# Patient Record
Sex: MALE | HISPANIC OR LATINO | Employment: FULL TIME | URBAN - METROPOLITAN AREA
[De-identification: names, ages, dates, MRNs, and addresses within clinical notes are randomized per-mention and may not be internally consistent; named-entity substitution may affect disease eponyms.]

---

## 2024-02-18 ENCOUNTER — TRANSFERRED RECORDS (OUTPATIENT)
Dept: HEALTH INFORMATION MANAGEMENT | Facility: CLINIC | Age: 29
End: 2024-02-18

## 2024-02-19 ENCOUNTER — TELEPHONE (OUTPATIENT)
Dept: BEHAVIORAL HEALTH | Facility: CLINIC | Age: 29
End: 2024-02-19

## 2024-02-19 ENCOUNTER — TELEPHONE (OUTPATIENT)
Dept: BEHAVIORAL HEALTH | Facility: HOSPITAL | Age: 29
End: 2024-02-19

## 2024-02-19 ENCOUNTER — TRANSFERRED RECORDS (OUTPATIENT)
Dept: HEALTH INFORMATION MANAGEMENT | Facility: CLINIC | Age: 29
End: 2024-02-19

## 2024-02-19 ENCOUNTER — HOSPITAL ENCOUNTER (INPATIENT)
Facility: HOSPITAL | Age: 29
LOS: 2 days | Discharge: HOME | DRG: 881 | End: 2024-02-21
Attending: STUDENT IN AN ORGANIZED HEALTH CARE EDUCATION/TRAINING PROGRAM | Admitting: STUDENT IN AN ORGANIZED HEALTH CARE EDUCATION/TRAINING PROGRAM
Payer: COMMERCIAL

## 2024-02-19 PROBLEM — R45.851 SUICIDAL IDEATION: Status: ACTIVE | Noted: 2024-02-19

## 2024-02-19 LAB
ALT SERPL-CCNC: 48 IU/L
AST SERPL-CCNC: 37 IU/L (ref 15–46)
CREATININE (EXTERNAL): 0.9 MG/DL (ref 0.66–1.25)
GFR ESTIMATED (EXTERNAL): >90 ML/MIN/1.73M2
GLUCOSE (EXTERNAL): 107 MG/DL (ref 70–100)
POTASSIUM (EXTERNAL): 4.2 MMOL/L (ref 3.5–5.1)
TSH SERPL-ACNC: 2.95 UIU/ML (ref 0.47–4.68)

## 2024-02-19 PROCEDURE — 124N000001 HC R&B MH

## 2024-02-19 RX ORDER — OLANZAPINE 10 MG/1
10 TABLET ORAL EVERY 6 HOURS PRN
Status: DISCONTINUED | OUTPATIENT
Start: 2024-02-19 | End: 2024-02-21 | Stop reason: HOSPADM

## 2024-02-19 RX ORDER — HYDROXYZINE HYDROCHLORIDE 25 MG/1
25 TABLET, FILM COATED ORAL EVERY 6 HOURS PRN
Status: DISCONTINUED | OUTPATIENT
Start: 2024-02-19 | End: 2024-02-21 | Stop reason: HOSPADM

## 2024-02-19 RX ORDER — CLONIDINE HYDROCHLORIDE 0.1 MG/1
0.1 TABLET ORAL 2 TIMES DAILY PRN
Status: DISCONTINUED | OUTPATIENT
Start: 2024-02-19 | End: 2024-02-21 | Stop reason: HOSPADM

## 2024-02-19 RX ORDER — OLANZAPINE 10 MG/2ML
10 INJECTION, POWDER, FOR SOLUTION INTRAMUSCULAR EVERY 6 HOURS PRN
Status: DISCONTINUED | OUTPATIENT
Start: 2024-02-19 | End: 2024-02-21 | Stop reason: HOSPADM

## 2024-02-19 RX ORDER — AMOXICILLIN 250 MG
1 CAPSULE ORAL 2 TIMES DAILY PRN
Status: DISCONTINUED | OUTPATIENT
Start: 2024-02-19 | End: 2024-02-21 | Stop reason: HOSPADM

## 2024-02-19 RX ORDER — MAGNESIUM HYDROXIDE/ALUMINUM HYDROXICE/SIMETHICONE 120; 1200; 1200 MG/30ML; MG/30ML; MG/30ML
30 SUSPENSION ORAL EVERY 4 HOURS PRN
Status: DISCONTINUED | OUTPATIENT
Start: 2024-02-19 | End: 2024-02-21 | Stop reason: HOSPADM

## 2024-02-19 RX ORDER — ACETAMINOPHEN 325 MG/1
650 TABLET ORAL EVERY 4 HOURS PRN
Status: DISCONTINUED | OUTPATIENT
Start: 2024-02-19 | End: 2024-02-21 | Stop reason: HOSPADM

## 2024-02-19 RX ORDER — TRAZODONE HYDROCHLORIDE 50 MG/1
50 TABLET, FILM COATED ORAL
Status: DISCONTINUED | OUTPATIENT
Start: 2024-02-19 | End: 2024-02-21 | Stop reason: HOSPADM

## 2024-02-19 ASSESSMENT — ACTIVITIES OF DAILY LIVING (ADL)
ADLS_ACUITY_SCORE: 28
LAUNDRY: UNABLE TO COMPLETE
ORAL_HYGIENE: INDEPENDENT
ADLS_ACUITY_SCORE: 28
DRESS: INDEPENDENT
HYGIENE/GROOMING: INDEPENDENT
HYGIENE/GROOMING: INDEPENDENT
DRESS: SCRUBS (BEHAVIORAL HEALTH);INDEPENDENT
ADLS_ACUITY_SCORE: 45
ADLS_ACUITY_SCORE: 28
ORAL_HYGIENE: INDEPENDENT
ADLS_ACUITY_SCORE: 28

## 2024-02-19 NOTE — DISCHARGE INSTRUCTIONS
Behavioral Discharge Planning and Instructions    Summary:     Main Diagnosis:     Health Care Follow-up:       [facility]  [address] [place] [address]  [phone]    [facility]  [address] [place] [address]  [phone]    [facility]   [address] [address]  Phone:  [phone]    [employer]  [address]  [place] [address] [phone]    Attend all scheduled appointments with your outpatient providers. Call at least 24 hours in advance if you need to reschedule an appointment to ensure continued access to your outpatient providers.     Major Treatments, Procedures and Findings:  You were provided with: a psychiatric assessment, assessed for medical stability, medication evaluation and/or management, group therapy, family therapy, individual therapy, CD evaluation/assessment, milieu management, and medical interventions    Symptoms to Report: feeling more aggressive, increased confusion, losing more sleep, mood getting worse, or thoughts of suicide    Early warning signs can include: increased depression or anxiety sleep disturbances increased thoughts or behaviors of suicide or self-harm  increased unusual thinking, such as paranoia or hearing voices    Safety and Wellness:  Take all medicines as directed.  Make no changes unless your doctor suggests them.      Follow treatment recommendations.  Refrain from alcohol and non-prescribed drugs.  Ask your support system to help you reduce your access to items that could harm yourself or others. Items could include:  Firearms  Medicines (both prescribed and over-the-counter)  Knives and other sharp objects  Ropes and like materials  Car keys  If there is a concern for safety, call 911. If there is a concern for safety, call 911.    Resources:   Crisis Intervention: 875.829.8208 or 668-803-4522 (TTY: 148.794.2044).  Call anytime for help.  National [place] on Mental Illness  "(www.mn.nash.org): 661.773.5983 or 238-770-8581.  [place] Association for Children's Mental Health (www.mac.org): 435.927.6753.  Alcoholics Anonymous (www.alcoholics-anonymous.org): Check your phone book for your local chapter.  Suicide Awareness Voices of Education (SAVE) (www.save.org): 969-748-FAMI (5988)  National Suicide Prevention Line (www.mentalhealthmn.org): 389-733-QYEX (2108)  Mental Health Consumer/Survivor Network of [place] (www.mhcsn.net): 850.724.8597 or 906-212-9300  Mental Health Association of [place] (www.mentalhealth.org): 365.534.1432 or 340-615-6394  Self- Management and Recovery Training., SMART-- Toll free: 863.806.5568  www.Prelert  Text 4 Life: txt \"LIFE\" to 71748 for immediate support and crisis intervention  Crisis text line: Text \"[place]\" to 969514. Free, confidential, 24/7.  Crisis Intervention: 576.227.9526 or 944-457-2381. Call anytime for help.     General Medication Instructions:   See your medication sheet(s) for instructions.   Take all medicines as directed.  Make no changes unless your doctor suggests them.   Go to all your doctor visits.  Be sure to have all your required lab tests. This way, your medicines can be refilled on time.  Do not use any drugs not prescribed by your doctor.  Avoid alcohol.    Advance Directives:   Scanned document on file with [facility]? No scanned doc  Is document scanned? Pt states no documents  Honoring Choices Your Rights Handout: Informed and given  Was more information offered? Minor-N/A    The Treatment team has appreciated the opportunity to work with you. If you have any questions or concerns about your recent admission, you can contact the unit which can receive your call 24 hours a day, 7 days a week. They will be able to get in touch with a Provider if needed. The unit number is [phone] .  "

## 2024-02-19 NOTE — PROGRESS NOTES
02/19/24 1502   Patient Belongings   Did you bring any home meds/supplements to the hospital?  No   Patient Belongings locker;sent to security per site process   Patient Belongings Put in Hospital Secure Location (Security or Locker, etc.) cash/credit card;cell phone/electronics;clothing;shoes;wallet   Belongings Search Yes   Clothing Search Yes   Second Staff Xi SUÁREZ   Comment Red white and blue crocks, grey sweat shirt, red white and blue cap, black t-shirt, black socks, green underware, black sweats     List items sent to safe: Black verizon phone in black case ( no cracks), garage ,Brown wallet, drivers license, cabelas, [employer] Vickery visa, permit to carry x2, costco , best buy, new depot, care credit card, delta dental, health care, money network visa x2, ehsan visa, social security  card,meical release to drive, several misc. Cards    All other belongings put in assigned cubby in belongings room.    I have reviewed my belongings list on admission and verify that it is correct.     Patient signature_______________________________    Second staff witness (if patient unable to sign) ______________________________       I have received all my belongings at discharge.    Patient signature________________________________    Divina   2/19/2024  3:05 PM

## 2024-02-19 NOTE — TELEPHONE ENCOUNTER
R:     8:52A Received call from Pao  Nasim informing direct admission from outside facility admitting to /[facility]. Requesting Indicia to be completed.     9:19A Indicia completed.
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
## 2024-02-19 NOTE — PLAN OF CARE
Face to face shift report received from Xi FRANCOIS RN. Rounding completed, pt observed.    Problem: Adult Behavioral Health Plan of Care  Goal: Develops/Participates in Therapeutic [place] to Support Successful Transition  Note: Shift Summary: Patient was resting in bed at the start of this shift.  Patient awakened for evening meal.  Patient quiet and withdrawn.  Ate well but chose to isolate to his room.     Care continued for next shift.  Patient observed in bed with eyes closed.  Respirations are visible and regular.  Independent with position changes.  Patient has been awake in his bed since 0330.  Denied needs.  Up and in lounge a little before 0600.  Remains quiet and with intermittent eye contact.  Problem: Suicide Risk  Goal: Absence of Self-Harm  Description: Patient will be free of suicidal ideation or intent by discharge.  Patient will agree to safety plan with staff.  Outcome: Progressing   Face to face report will be communicated to oncoming RN.    Millie Asencio RN  2/19/2024

## 2024-02-20 LAB
EST. AVERAGE GLUCOSE BLD GHB EST-MCNC: 120 MG/DL
HBA1C MFR BLD: 5.8 %
HOLD SPECIMEN: NORMAL
HOLD SPECIMEN: NORMAL

## 2024-02-20 PROCEDURE — 83036 HEMOGLOBIN GLYCOSYLATED A1C: CPT | Performed by: NURSE PRACTITIONER

## 2024-02-20 PROCEDURE — 99222 1ST HOSP IP/OBS MODERATE 55: CPT | Mod: AI | Performed by: STUDENT IN AN ORGANIZED HEALTH CARE EDUCATION/TRAINING PROGRAM

## 2024-02-20 PROCEDURE — 36415 COLL VENOUS BLD VENIPUNCTURE: CPT | Performed by: NURSE PRACTITIONER

## 2024-02-20 PROCEDURE — 124N000001 HC R&B MH

## 2024-02-20 PROCEDURE — 99222 1ST HOSP IP/OBS MODERATE 55: CPT | Performed by: NURSE PRACTITIONER

## 2024-02-20 PROCEDURE — 250N000013 HC RX MED GY IP 250 OP 250 PS 637: Performed by: NURSE PRACTITIONER

## 2024-02-20 RX ORDER — MULTIVITAMIN,THERAPEUTIC
1 TABLET ORAL DAILY
COMMUNITY

## 2024-02-20 RX ORDER — MULTIPLE VITAMINS W/ MINERALS TAB 9MG-400MCG
1 TAB ORAL DAILY
Status: DISCONTINUED | OUTPATIENT
Start: 2024-02-20 | End: 2024-02-21 | Stop reason: HOSPADM

## 2024-02-20 RX ORDER — VITAMIN B COMPLEX
1 TABLET ORAL DAILY
Status: DISCONTINUED | OUTPATIENT
Start: 2024-02-20 | End: 2024-02-21 | Stop reason: HOSPADM

## 2024-02-20 RX ADMIN — Medication 1 TABLET: at 14:13

## 2024-02-20 RX ADMIN — MULTIPLE VITAMINS W/ MINERALS TAB 1 TABLET: TAB at 14:13

## 2024-02-20 ASSESSMENT — ACTIVITIES OF DAILY LIVING (ADL)
LAUNDRY: UNABLE TO COMPLETE
ADLS_ACUITY_SCORE: 28
DRESS: INDEPENDENT
ADLS_ACUITY_SCORE: 28
ADLS_ACUITY_SCORE: 28
DRESS: INDEPENDENT
HYGIENE/GROOMING: INDEPENDENT
HYGIENE/GROOMING: INDEPENDENT
ADLS_ACUITY_SCORE: 28
ORAL_HYGIENE: INDEPENDENT
ORAL_HYGIENE: INDEPENDENT

## 2024-02-20 NOTE — PLAN OF CARE
Face to face shift report received from Niki BARR RN. Rounding completed, pt observed.    Problem: Adult Behavioral Health Plan of Care  Goal: Patient-Specific Goal (Individualization)  Description: Patient will sleep at least 5 hours per night during hospital stay.  Patient will eat at least 50% of meals  Patient will maintain ADLs.  Patient will agree to Treatment Team recommendations for after discharge.  Outcome: Progressing  Note: Shift Summary:  Patient was sitting in lounge at the start of this shift. Patient is cooperative with nurse.  Denies any needs. Little interaction with peers. Gait steady.      Problem: Suicide Risk  Goal: Absence of Self-Harm  Description: Patient will be free of suicidal ideation or intent by discharge.  Patient will agree to safety plan with staff.  Outcome: Progressing  Face to face report will be communicated to oncoming RN.    Millie Asencio RN  2/20/2024

## 2024-02-20 NOTE — PLAN OF CARE
"Face to face end of shift report received from Millie HAYES RN. Rounding completed. Patient observed in bed, awake.     Pt has been withdrawn, isolative to his room today. He has concerns that the level of care provided here is more than what he needs. He denied ever being suicidal stating \"My dogs woke me up and next thing I knew I was being brought to the hospital. Everything is just a lot with my wife leaving me, the house. And the bank account.\" Pt denied any SI/HI and AH/VH. Denied pain. Provided shower supplies. He has not attended any groups today. He is able to make his needs known. Steady gait- no falls. Frequent rounding.     Problem: Adult Behavioral Health Plan of Care  Goal: Patient-Specific Goal (Individualization)  Description: Patient will sleep at least 5 hours per night during hospital stay.  Patient will eat at least 50% of meals  Patient will maintain ADLs.  Patient will agree to Treatment Team recommendations for after discharge.  Outcome: Progressing     Problem: Suicide Risk  Goal: Absence of Self-Harm  Description: Patient will be free of suicidal ideation or intent by discharge.  Patient will agree to safety plan with staff.  Outcome: Progressing       Niki Caceres RN  2/20/2024  1:39 PM    "
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
## 2024-02-20 NOTE — H&P
[facility]    History and Physical  Medical Services       Date of Admission:  2/19/2024  Date of Service (when I saw the patient): 02/20/24    Assessment & Plan     Principal Problem:    Suicidal ideation    Active Medical Problems:  Prediabetes- A1c 5.8. Will discuss Metformin with him and if agreeable will order.     Morbid obesity- weight- 157.3 kg, BMI 54.3. Encourage diet and exercise.     Pt medically stable, no acute medical concerns. Chronic medical problems stable. Will sign off. Please consult for any new medical issues or concerns.      Code Status: Full Code    Christy Peña CNP    Primary Care Physician   Physician No Ref-Primary    Chief Complaint   Psych evaluation     History is obtained from the patient and electronic health record    History of Present Illness   Selvin Norwood is a 29 year old male who presents to [facility] in [place] via police presenting with suicidal ideations with intent. Pt presents with increasing depression with suicidal ideations. He is  from his wife. He sent her concerning texts regarding not wanting to be here anymore, you don't understand, I will be watching everyone from above. He had a similar episode 3 years ago where he was found in the woods with a gun.     Past Medical History    I have reviewed this patient's medical history and updated it with pertinent information if needed.   No past medical history on file.    Past Surgical History   I have reviewed this patient's surgical history and updated it with pertinent information if needed.  No past surgical history on file.    Prior to Admission Medications   Prior to Admission Medications   Prescriptions Last Dose Informant Patient Reported? Taking?   B Complex-Biotin-FA (SUPER B-COMPLEX) TABS Past Month  Yes Yes   Sig: Take 1 tablet by mouth daily   Cholecalciferol (VITAMIN D-3 PO) Past Month  Yes Yes   Sig: Take 1 capsule by mouth daily   multivitamin, therapeutic (THERA-VIT) TABS  tablet Past Month  Yes Yes   Sig: Take 1 tablet by mouth daily      Facility-Administered Medications: None     Allergies   No Known Allergies    Social History   I have reviewed this patient's social history and updated it with pertinent information if needed. Selvin Norwood      Family History   I have reviewed this patient's family history and updated it with pertinent information if needed.   No family history on file.    Review of Systems   CONSTITUTIONAL:  negative  EYES:  negative  HEENT:  negative  RESPIRATORY:  negative  CARDIOVASCULAR:  negative  GASTROINTESTINAL:  negative  GENITOURINARY:  negative  INTEGUMENT/BREAST:  negative  HEMATOLOGIC/LYMPHATIC:  negative  ALLERGIC/IMMUNOLOGIC:  negative  ENDOCRINE:  negative  MUSCULOSKELETAL:  negative  NEUROLOGICAL:  negative    Physical Exam   Temp: 98.7  F (37.1  C) Temp src: Temporal BP: 142/88 Pulse: 79   Resp: 16 SpO2: 98 % O2 Device: None (Room air)    Vital Signs with Ranges  Temp:  [97.3  F (36.3  C)-98.8  F (37.1  C)] 98.7  F (37.1  C)  Pulse:  [75-96] 79  Resp:  [16-18] 16  BP: (136-176)/() 142/88  SpO2:  [95 %-99 %] 98 %  346 lbs 11.2 oz    Constitutional: awake, alert, cooperative, no apparent distress, and appears stated age, vitals stable   Eyes: Lids and lashes normal, pupils equal, round and reactive to light, extra ocular muscles intact, sclera clear, conjunctiva normal  ENT: Normocephalic, without obvious abnormality, atraumatic, external ears without lesions, oral pharynx with moist mucous membranes, no erythema or exudates  Hematologic / Lymphatic: no cervical lymphadenopathy  Respiratory: No increased work of breathing, good air exchange, clear to auscultation bilaterally, no crackles or wheezing  Cardiovascular: Normal apical impulse, regular rate and rhythm, normal S1 and S2, no S3 or S4, and no murmur noted  GI: obese, normal bowel sounds, soft, non-distended, non-tender, no masses palpated, no  hepatosplenomegally  Genitounirinary: deferred  Skin: normal skin color, texture, turgor and no redness, warmth, or swelling  Musculoskeletal: There is no redness, warmth, or swelling of the joints.  Full range of motion noted.    Neurologic: Awake, alert, oriented to name, place and time.  Cranial nerves II-XII are grossly intact.    Neuropsychiatric: General: blunted,  calm, and fair eye contact    Data   Data reviewed today:   No lab results found in last 7 days.    No results found for this or any previous visit (from the past 24 hour(s)).

## 2024-02-20 NOTE — PLAN OF CARE
"Social Service Psychosocial Assessment    Presenting Problem: per admission note, \"Per collateral report, pt. Texted wife and stated that he didn't want to be here anymore. Pt. Has a hx. Of going into the woods 3 years ago w/ guns to harm himself.\"   \"pt stated his wife called the  on him and that is why he is here.\" Pt admitted for suicidal ideation.    Marital Status: , currently     Spouse / Children: Wife- Jennifer, 3 children    Psychiatric [place] HX: none reported    Suicide Risk Assessment: Pt admitted with suicidal ideation, Pt denies current or history of suicidal ideation. 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 had an episode 3 years ago where he and his wife were having marital problems and he was found out in the woods with a gun.\"    Access to Lethal Means (explain): firearms - secured    Family Psych HX: no known history      A & Ox: x4      Medication Adherence: See H&P    Medical Issues: See H&P      Visual -Motor Functioning: Good    Communication Skills /Needs: Good    Ethnicity: Choose not to Answer      Spirituality/Hoahaoism Affiliation: none    Clergy Request: No      History: None reported      Living Situation: currently lives alone, previously lived with wife and children     ADL s: Independent       Education: high school and some college    Financial Situation: pt stated that he's \"gotta figure some stuff out\"    Occupation:      Leisure & Recreation: enjoys listening to music, sports, darts, and spending time with friends    Childhood History: grew up with mom as an only child in [place]     Trauma Abuse HX: none    Relationship / Sexuality: heterosexual    Substance Use/ Abuse: pt states \"recreational alcohol use\"    Chemical Dependency Treatment HX: none    Legal Issues: none reported    Significant Life Events: wife moved out recently    Strengths: Ability to communicate needs, in a safe environment, has insurance    Challenges " "/Limitation: Poor coping skills, current mental health symptoms, lack of interest in services    Patient Support Contact (Include name, relationship, number, and summary of conversation): pt currently has no EUNICE signed     Interventions:         Community-Based Programs- would benefit    Medical/Dental Care- would benefit, pt not interested    Medication Management- would benefit, pt not interested    Individual Therapy- would benefit, pt not interested, resources listed in AVS    Insurance Coverage- [facility]     Suicide Risk Assessment- Pt admitted with suicidal ideation, Pt denies current or history of suicidal ideation. 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    Claudia Schwab  2/20/2024  12:39 PM   "

## 2024-02-20 NOTE — PROGRESS NOTES
"[facility]   HISTORY AND PHYSICAL     ADMISSION DATA     Selvin Norwood MRN# [id number]   Age: 29 year old YOB: 1995     Date of Admission: 2/19/2024  Primary Physician: No Ref-Primary, Physician        CHIEF COMPLAINT   \"SI.\"       HISTORY OF PRESENT ILLNESS     Per ED:    Caller stated the series of events for the day occurred due to the pt's infedelity. She reports she left the family home and went to live with her parents. She states today, she met with the pt to hand the children off to him and he \"went dark\" telling her: \"I can't do this anymore.\" \"You and the kids don't need me.\" The pt's wife then attempted to calm the pt encouraging him to go to bed for the evening to which he said \" you don't understand, you're not going to see me anymore.\". She states the pt told her he was taking the children to his grandmother's house because they would be safe there while insinuating he was going to harm himself and did not want his children around with whatever he was going to to do to himself. The caller stated the pt did this 3 years ago when he cheated on her then. During that event, the pt was located in the [place] with a firearm having threatened to harm himself. Caller expressed concern about the pt's access to weapons and her fear that he would use his gun on himself.     Per Patient:    The patient notes that he is not sure what happened. He notes that his spouse left with the kids on Friday. He felt stressed as a result. He notes feeling sad and down about the situation. He notes that on Sunday night he was surprised that the police showed up after he was talking to his spouse. He denies having SI currently. He notes that he was stressed about the situation and is just trying to figure out the best way forward.    The patient denies any SI today. He denies any depression. No report of any current intent or plan to hurt himself. He notes that he has his children to live for. "     He denies any AVH. No mention of signs of mansoor. No substance use.    He denies any physical concerns. He notes that his firearms are secured.        PSYCHIATRIC HISTORY     No prior history. No history of hospitalizations. No history of suicide attempts. No history of medication trials.        SUBSTANCE USE HISTORY   History   Drug Use Not on file       Social History    Substance and Sexual Activity      Alcohol use: Not on file      History   Smoking Status    Not on file   Smokeless Tobacco    Not on file     Noncontributory.        SOCIAL HISTORY   Social History     Socioeconomic History    Marital status:      Spouse name: Not on file    Number of children: Not on file    Years of education: Not on file    Highest education level: Not on file   Occupational History    Not on file   Tobacco Use    Smoking status: Not on file    Smokeless tobacco: Not on file   Substance and Sexual Activity    Alcohol use: Not on file    Drug use: Not on file    Sexual activity: Not on file   Other Topics Concern    Not on file   Social History Narrative    Not on file     Social Determinants of Health     Financial Resource Strain: Not on file   Food Insecurity: Not on file   Transportation Needs: Not on file   Physical Activity: Not on file   Stress: Not on file   Social Connections: Not on file   Interpersonal Safety: Not on file   Housing Stability: Not on file     Lives in wife and 3 children. Employed as . Access to firearms- secured.        FAMILY HISTORY   No family history on file.      PAST MEDICAL HISTORY   No past medical history on file.    No past surgical history on file.    Patient has no known allergies.     MEDICATIONS   Prior to Admission medications    Medication Sig Start Date End Date Taking? Authorizing Provider   B Complex-Biotin-FA (SUPER B-COMPLEX) TABS Take 1 tablet by mouth daily   Yes Reported, Patient   Cholecalciferol (VITAMIN D-3 PO) Take 1 capsule by mouth daily   Yes  "Reported, Patient   multivitamin, therapeutic (THERA-VIT) TABS tablet Take 1 tablet by mouth daily   Yes Reported, Patient        PHYSICAL EXAM/ROS     I have reviewed the physical exam as documented by the medical team, NP Reder and agree with findings and assessment and have no additional findings to add at this time. The review of systems is negative other than noted in the HPI.       LABS   Recent Results (from the past 24 hour(s))   Hemoglobin A1c    Collection Time: 02/20/24 10:23 AM   Result Value Ref Range    Estimated Average Glucose 120 mg/dL    Hemoglobin A1C 5.8 (H) <5.7 %   Extra Red Top Tube    Collection Time: 02/20/24 10:23 AM   Result Value Ref Range    Hold Specimen JIC    Extra Green Top (Lithium Heparin) Tube    Collection Time: 02/20/24 10:23 AM   Result Value Ref Range    Hold Specimen JIC          MENTAL STATUS EXAM   Vitals: /88   Pulse 79   Temp 98.7  F (37.1  C) (Temporal)   Resp 16   Ht 1.702 m (5' 7\")   Wt (!) 157.3 kg (346 lb 11.2 oz)   SpO2 98%   BMI 54.30 kg/m      Appearance: Alert, oriented, dressed in hospital scrubs  Attitude: Cooperative   Eye Contact: Fair  Mood: \"Alright\"  Affect: Full range of affect, mood congruent  Speech: Normal range. Normal rhythm   Psychomotor Behavior: No tremor, rigidity, akathisia, or psychomotor retardation    Thought Process: Logical, goal directed   Associations: No loose associations   Thought Content: Denies SI. No SIB. Denies AVH. No evidence of delusional thought  Insight: Good  Judgment: Good  Oriented to: Person, place, and time  Attention Span and Concentration: Intact  Recent and Remote Memory: Intact  Language: English with appropriate syntax and vocabulary  Fund of Knowledge: Average  Muscle Strength and Tone: Grossly normal  Gait and Station: Grossly normal       ASSESSMENT     This is a 29 year old male with no PMH of mental health conditions who presents with SI occurring in the setting of his spouse leaving with his " children due to marital conflict. He has no history of suicide attempts or hospitalizations. He reports no medication trials. No evidence patient is in a depressive episode currently with this being best described as an adjustment disorder.    In terms of treatment, recommend crisis stabilization and psychotherapy with resource allocation.       DIAGNOSIS     #. Suicidal ideation  #. Adjustment disorder with depressed mood       PLAN     Location: Unit 5  Legal Status: Orders Placed This Encounter      Legal status 72 Hour Hold    Safety Assessment:    Behavioral Orders   Procedures    Code 1 - Restrict to Unit    Routine Programming     As clinically indicated    Status 15     Every 15 minutes.      PTA psychotropic medications held:     -None, as not on any    PTA psychotropic medications continued/changed:     -None, as not on any    New psychotropic medications initiated:     -Standard unit prn agents, including Zyprexa prn agitation    Programming: Patient will be treated in a therapeutic milieu with appropriate individual and group therapies. Education will be provided on diagnoses, medications, and treatments.     Medical diagnoses:  Per medicine    Consult: None  Tests: None    Anticipated LOS: 1-2 days   Disposition: Home with outpatient services (therapy)    Justification for hospitalization: reasons for hospitalization include potential safety risk to self or others within the last week, decreased functioning in outpatient setting and in the setting of no outpatient management, need for highly structured inpatient management for stabilization of psychiatric symptoms, need for psychiatric medication initiation and stabilization.       ATTESTATION      Dr. Bebeto Houston  Psychiatrist

## 2024-02-20 NOTE — MEDICATION SCRIBE - ADMISSION MEDICATION HISTORY
Medication Scribe Admission Medication History    Admission medication history is complete. The information provided in this note is only as accurate as the sources available at the time of the update.    Information Source(s): Patient and CareEverywhere/SureScripts via in-person    Pertinent Information:   Patient denies taking any prescribed medications, nor is supposed to be taking any prescribed medications, at this time (scheduled and/or PRN).     Patient reports the following OTC products: usually takes a mtv, super-c-complex and vit d, has not been taking the past month    No conflicting data from any available outside sources.     Changes made to PTA medication list:  Added: mtv, b-complex, d3  Deleted: None  Changed: None    Allergies reviewed with patient and updates made in EHR: yes    Medication History Completed By: Heidi Willson 2/20/2024 11:33 AM    PTA Med List   Medication Sig Last Dose    B Complex-Biotin-FA (SUPER B-COMPLEX) TABS Take 1 tablet by mouth daily Past Month    Cholecalciferol (VITAMIN D-3 PO) Take 1 capsule by mouth daily Past Month    multivitamin, therapeutic (THERA-VIT) TABS tablet Take 1 tablet by mouth daily Past Month

## 2024-02-21 VITALS
BODY MASS INDEX: 49.44 KG/M2 | HEIGHT: 67 IN | HEART RATE: 68 BPM | DIASTOLIC BLOOD PRESSURE: 95 MMHG | WEIGHT: 315 LBS | TEMPERATURE: 98.2 F | OXYGEN SATURATION: 98 % | RESPIRATION RATE: 16 BRPM | SYSTOLIC BLOOD PRESSURE: 144 MMHG

## 2024-02-21 PROCEDURE — 99239 HOSP IP/OBS DSCHRG MGMT >30: CPT | Mod: 95 | Performed by: STUDENT IN AN ORGANIZED HEALTH CARE EDUCATION/TRAINING PROGRAM

## 2024-02-21 ASSESSMENT — ACTIVITIES OF DAILY LIVING (ADL)
ADLS_ACUITY_SCORE: 28
HYGIENE/GROOMING: INDEPENDENT
ADLS_ACUITY_SCORE: 28
DRESS: INDEPENDENT
ORAL_HYGIENE: INDEPENDENT

## 2024-02-21 NOTE — PLAN OF CARE
Problem: Adult Behavioral Health Plan of Care  Goal: Plan of Care Review  Outcome: Adequate for Care Transition     Problem: Adult Behavioral Health Plan of Care  Goal: Patient-Specific Goal (Individualization)  Description: Patient will sleep at least 5 hours per night during hospital stay.  Patient will eat at least 50% of meals  Patient will maintain ADLs.  Patient will agree to Treatment Team recommendations for after discharge.  Outcome: Adequate for Care Transition     Problem: Adult Behavioral Health Plan of Care  Goal: Adheres to Safety Considerations for Self and Others  Outcome: Adequate for Care Transition     Problem: Adult Behavioral Health Plan of Care  Goal: Absence of New-Onset Illness or Injury  Outcome: Adequate for Care Transition     Problem: Adult Behavioral Health Plan of Care  Goal: Optimized Coping Skills in Response to Life Stressors  Outcome: Adequate for Care Transition     Problem: Adult Behavioral Health Plan of Care  Goal: Develops/Participates in Therapeutic [place] to Support Successful Transition  Outcome: Adequate for Care Transition     Problem: Suicide Risk  Goal: Absence of Self-Harm  Description: Patient will be free of suicidal ideation or intent by discharge.  Patient will agree to safety plan with staff.  Outcome: Adequate for Care Transition  07:30 - face to face end of shift report received from night shift rn, pt observed out in [facility] this am with peers.  08:5 pt sitting out in [facility], denies all criteria, states is ready to discharge today, denies pain, declines vitamins this am, states he will take his own at home.    09:45 - pt given note for work from Dr Houston.  10:00 - Discharge Note    Patient Discharged to home on 2/21/2024 10:01 AM via [employer] accompanied by .     Patient informed of discharge instructions in AVS. patient verbalizes understanding and denies having any questions pertaining to AVS. Patient stable at time of discharge. Patient denies SI, HI,  "and thoughts of self harm at time of discharge. All personal belongings returned to patient. Discharge prescriptions sent to n/a np  Psych evaluation, history and physical, AVS, and discharge summary faxed to next level of care- no prescriptions given to pt.  No new meds.  Pt verbalized discharge instruction and denies all criteria, states \"this was just really overblown\"  .     FORREST MARY RN  2/21/2024  10:01 AM                        "

## 2024-02-21 NOTE — PLAN OF CARE
Problem: Suicide Risk  Goal: Absence of Self-Harm  Description: Patient will be free of suicidal ideation or intent by discharge.  Patient will agree to safety plan with staff.  Outcome: Progressing     Problem: Adult Behavioral Health Plan of Care  Goal: Patient-Specific Goal (Individualization)  Description: Patient will sleep at least 5 hours per night during hospital stay.  Patient will eat at least 50% of meals  Patient will maintain ADLs.  Patient will agree to Treatment Team recommendations for after discharge.  Outcome: Progressing     Face to Face report received from KAMINI Pinto.  Rounding completed. Patient observed in bed appearing to sleep for 5.5 hours with even & unlabored respirations.  15 minute and PRN checks all night. No complaints offered. Patient able to make needs known.  Will continue to monitor.        Face to face end of shift report communicated to day shift RN.     Riri Brody RN  2/21/2024  6:35 AM

## 2024-02-21 NOTE — PROGRESS NOTES
Pt is discharging at the recommendation of the treatment team. Pt is discharging to home transported by [employer]. Pt denies having any thoughts of hurting themself or anyone else. Pt denies anxiety or depression. Pt has therapy resources listed in AVS. Discharge instructions, including; demographic sheet, psychiatric evaluation, discharge summary, and AVS were faxed to these next level of care providers.     [facility] will be here at 10:00am to  pt.

## 2024-02-21 NOTE — DISCHARGE SUMMARY
[facility] PSYCHIATRY  DISCHARGE SUMMARY     DISCHARGE DATA     Selvin Norwood MRN# [id number]   Age: 29 year old YOB: 1995     Date of Admission: 2/19/2024  Date of Discharge: 2/21/2024  Discharge Provider: Bebeto Houston DO       REASON FOR ADMISSION     This is a 29 year old male with no PMH of mental health conditions who presents with SI occurring in the setting of his spouse leaving with his children due to marital conflict. He has no history of suicide attempts or hospitalizations. He reports no medication trials. No evidence patient is in a depressive episode currently with this being best described as an adjustment disorder.     In terms of treatment, recommend crisis stabilization and psychotherapy with resource allocation.        DISCHARGE DIAGNOSES     #. Suicidal ideation, resolved  #. Adjustment disorder with depressed mood       CONSULTS     None       HOSPITAL COURSE   Psychiatric Course:    Legal status: Orders Placed This Encounter      Legal status 72 Hour Hold    Patient was admitted to unit 5 due to the aforementioned presentation. The patient was placed under 15 minute checks to ensure patient safety. The patient participated in unit programming and groups as able.    Mr. Norwood did not require seclusion/restraint during hospitalization.     We reviewed with Mr. Norwood current and past medication trials including duration, dose, response and side effects. During this hospitalization, the following changes to the patient's psychotropic medications were made:    PTA psychotropic medications held:      -None, as not on any     PTA psychotropic medications continued/changed:      -None, as not on any     New psychotropic medications initiated:      -None as patient not interested and no need    The patient presented with SI after going through a significant social stressors. The patient was not found to be in a depressive episode but going through an adjustment  disorder. The patient was provided with group programming and brief supportive therapy. The patient's SI resolved and he was forward thinking. Discussed his presentation with his mother who plans to be with him with her removing firearms from his home. She noted feeling safe with him returning home. Recommended the patient pursue psychotherapy with him agreeing     With these changes and supports the patient noticed improvement in their symptoms and felt sufficiently ready for discharge. The patient was euthymic and his SI resolved. As a result, Selvin Norwood was discharged. At the time of discharge, Selvin Norwood was determined to not be a danger to self or others. At the current time of discharge, the patient does not meet criteria for involuntary hospitalization. On the day of discharge, the patient reports that they do not have suicidal or homicidal ideation. Steps taken to minimize risk include: assessing patient s behavior and thought process daily during hospital stay, discharging patient with adequate plan for follow up for mental and physical health and discussing safety plan of returning to the hospital should the patient ever have thoughts of harming themselves or others. Therefore, based on all available evidence including the factors cited above, the patient does not appear to be at imminent risk for self-harm, and is appropriate for outpatient level of care. However, if patient uses substances or is medication non-adherent, their risk of decompensation and SI will be elevated. This was discussed with the patient.    Medical Course:    The patient was medically cleared for admission to inpatient psychiatry. No medical issues arose. Discussed with the patient how he has prediabetes with him not interested in treatment and preferring to follow-up with his PCP. Recommended diet and exercise. The patient was medically stable at the time of discharge.        DISCHARGE MEDICATIONS     Current  "Discharge Medication List        CONTINUE these medications which have NOT CHANGED    Details   B Complex-Biotin-FA (SUPER B-COMPLEX) TABS Take 1 tablet by mouth daily      Cholecalciferol (VITAMIN D-3 PO) Take 1 capsule by mouth daily      multivitamin, therapeutic (THERA-VIT) TABS tablet Take 1 tablet by mouth daily              MENTAL STATUS EXAM   Vitals: /95   Pulse 68   Temp 98.2  F (36.8  C) (Temporal)   Resp 16   Ht 1.702 m (5' 7\")   Wt (!) 157.3 kg (346 lb 11.2 oz)   SpO2 98%   BMI 54.30 kg/m      Appearance: Alert, oriented, dressed in hospital scrubs, appears stated age   Attitude: Cooperative   Eye Contact: Good  Mood: \"Good\"  Affect: Full range of affect  Speech: Normal rate and rhythm   Psychomotor Behavior: No tremor, rigidity, or psychomotor abnormality   Thought Process: Logical, goal directed   Associations: No loose associations   Thought Content: Denies SI or plan. No SIB. Denies A/V hallucinations. No evidence of delusional thought.  Insight: Good  Judgment: Good  Oriented to: Person, place, and time  Attention Span and Concentration: Intact  Recent and Remote Memory: Intact  Language: English with appropriate syntax and vocabulary  Fund of Knowledge: Average  Muscle Strength and Tone: Grossly normal  Gait and Station: Grossly normal       DISCHARGE PLAN     1.  Education given regarding diagnostic and treatment options with risks, benefits and alternatives with adequate verbalization of understanding.  2.  Discharge to home. Upon detailed review of risk factors, patient amenable for release.   3.  Continue aforementioned medications and associated medication changes with follow-up by outpatient provider.  4.  Crisis management planning in place.    5.  Nursing and  to review further discharge recommendations.   6.  Patient is being discharged with the following appointments:    Health Care Follow-up:      [facility]  [address] [place] " [address] [phone]     [facility]  [address],   [place] [address]  [phone]     [facility]   [address]  [place] [address]  Phone:  [phone]     [facility]  [address]  [place] [address] [phone]    7. General discharge instructions:       Reason for your hospital stay    SI     Follow-up and recommended labs and tests     Follow-up with your health care provider as documented in the AVS. Recommended follow-up labs/tests include the following: A1c in 90 days.     Activity    Your activity upon discharge: activity as tolerated     Diet    Follow this diet upon discharge: Orders Placed This Encounter      Regular Diet Adult           DISCHARGE SERVICES PROVIDED     39 minutes spent on discharge services, including:  Final examination of patient.  Review and discussion of hospital stay.  Instructions for continued outpatient care/goals.  Preparation of discharge records.  Preparation of medications refills and new prescriptions.  Preparation of applicable referral forms.        ATTESTATION     Dr. Bebeto Houston  Psychiatrist     VIDEO VISIT    Patient has given verbal consent for video visit?: Yes     Video- Visit Details  Type of service:  video visit for mental health treatment.  Time of service:  Date:  02/21/2024  Video Start Time: 730 AM  Video End Time: 800 AM    Reason for video visit[place]  Originating Site (patient location):  [facility]  Distant Site (provider location):  Remote location  Mode of Communication:  Video Conference via [employer]       LABS THIS ADMISSION     Results for orders placed or performed during the hospital encounter of 02/19/24   Hemoglobin A1c     Status: Abnormal   Result Value Ref Range    Estimated Average Glucose 120 mg/dL    Hemoglobin A1C 5.8 (H) <5.7 %   Extra Tube     Status: None    Narrative    The following orders were created for panel order Extra Tube.  Procedure                                Abnormality         Status                     ---------                               -----------         ------                     Extra Red Top Tube[[id number]]                               Final result               Extra Green Top (Lithium...[[id number]]                      Final result                 Please view results for these tests on the individual orders.   Extra Red Top Tube     Status: None   Result Value Ref Range    Hold Specimen JIC    Extra Green Top (Lithium Heparin) Tube     Status: None   Result Value Ref Range    Hold Specimen JIC

## 2024-02-22 ENCOUNTER — TELEPHONE (OUTPATIENT)
Dept: BEHAVIORAL HEALTH | Facility: HOSPITAL | Age: 29
End: 2024-02-22

## 2024-02-22 NOTE — TELEPHONE ENCOUNTER
"[facility]: Post-Hospital Discharge Note     Situation   Post hospital discharge call placed 02/22/24.      Selvin did not answer. A message was left.  Messages are left with a call back number should they need to reach staff with any questions, need for additional resources, or need assistance setting up a post hospitalization follow up appointments, if unable to do so independently.    Inpatient Mental Health Admission Information:  Admission Date: 2/19/24  Admission Reason: This is a 29 year old male with no PMH of mental health conditions who presents with SI occurring in the setting of his spouse leaving with his children due to marital conflict. He has no history of suicide attempts or hospitalizations. He reports no medication trials. No evidence patient is in a depressive episode currently with this being best described as an adjustment disorder.     In terms of treatment, recommend crisis stabilization and psychotherapy with resource allocation.    Inpatient Mental Health Discharge Information:  Discharge Date: 2/21/24  Discharged to: Home/ self care  Discharge Diagnosis: Suicidal ideation, resolved  #. Adjustment disorder with depressed mood    Background    The following information is obtained from the hospital after visit summary and inpatient provider notes.     \"Psychiatric Course:     Legal status: Orders Placed This Encounter      Legal status 72 Hour Hold     Patient was admitted to unit 5 due to the aforementioned presentation. The patient was placed under 15 minute checks to ensure patient safety. The patient participated in unit programming and groups as able.     Mr. Norwood did not require seclusion/restraint during hospitalization.      We reviewed with Mr. Norwood current and past medication trials including duration, dose, response and side effects. During this hospitalization, the following changes to the patient's psychotropic medications were made:     PTA psychotropic medications " held:      -None, as not on any     PTA psychotropic medications continued/changed:      -None, as not on any     New psychotropic medications initiated:      -None as patient not interested and no need     The patient presented with SI after going through a significant social stressors. The patient was not found to be in a depressive episode but going through an adjustment disorder. The patient was provided with group programming and brief supportive therapy. The patient's SI resolved and he was forward thinking. Discussed his presentation with his mother who plans to be with him with her removing firearms from his home. She noted feeling safe with him returning home. Recommended the patient pursue psychotherapy with him agreeing      With these changes and supports the patient noticed improvement in their symptoms and felt sufficiently ready for discharge. The patient was euthymic and his SI resolved. As a result, Selvin Norwood was discharged. At the time of discharge, Selvin Norwood was determined to not be a danger to self or others. At the current time of discharge, the patient does not meet criteria for involuntary hospitalization. On the day of discharge, the patient reports that they do not have suicidal or homicidal ideation. Steps taken to minimize risk include: assessing patient s behavior and thought process daily during hospital stay, discharging patient with adequate plan for follow up for mental and physical health and discussing safety plan of returning to the hospital should the patient ever have thoughts of harming themselves or others. Therefore, based on all available evidence including the factors cited above, the patient does not appear to be at imminent risk for self-harm, and is appropriate for outpatient level of care. However, if patient uses substances or is medication non-adherent, their risk of decompensation and SI will be elevated. This was discussed with the patient.    "  Medical Course:     The patient was medically cleared for admission to inpatient psychiatry. No medical issues arose. Discussed with the patient how he has prediabetes with him not interested in treatment and preferring to follow-up with his PCP. Recommended diet and exercise. The patient was medically stable at the time of discharge. \"        Post Discharge Assessment   How have your symptoms been since being discharged from the hospital? Message left to call back with any questions or concerns  Do you have your discharge instructions/after visit summary? NA  Do you have any questions related to your discharge instructions? NA    Discharge Medication Assessment   Medications were reviewed in full on discharge, including: Medications to be started, medications to be stopped, medications to be continued from preadmission and any side effects.      Prescriptions were e-scribed or sent to their preferred pharmacy at discharge and were able to be filled: NA  Do you have any questions about your medications? NA    Outpatient Plan/Future Appointments  Discharge follow up appointment scheduled within 14 days of discharging from hospital? No: Resources provided   Health Care Follow-up:      [facility]  [address] [place] [address] [phone]     [facility]  [address] [place] [address]  [phone]     [facility]   [address] [place] [address]  Phone:  [phone]     [facility]  [address]  [place] [address] [phone]      Further information, barriers, or follow up this writer has addressed: N/A    "